# Patient Record
Sex: FEMALE | Race: WHITE | Employment: OTHER | URBAN - METROPOLITAN AREA
[De-identification: names, ages, dates, MRNs, and addresses within clinical notes are randomized per-mention and may not be internally consistent; named-entity substitution may affect disease eponyms.]

---

## 2020-05-08 DIAGNOSIS — Z11.59 ENCOUNTER FOR SCREENING FOR OTHER VIRAL DISEASES: Primary | ICD-10-CM

## 2020-10-27 ENCOUNTER — DOCUMENTATION ONLY (OUTPATIENT)
Dept: OTHER | Facility: CLINIC | Age: 35
End: 2020-10-27

## 2020-11-03 RX ORDER — ALPRAZOLAM 1 MG
1 TABLET ORAL PRN
COMMUNITY
Start: 2020-05-28

## 2020-11-03 RX ORDER — CALCIUM CARBONATE 1250 MG/5ML
1500 SUSPENSION ORAL 2 TIMES DAILY
COMMUNITY
Start: 2020-04-14

## 2020-11-03 RX ORDER — CLINDAMYCIN PHOSPHATE 10 MG/G
GEL TOPICAL 2 TIMES DAILY
COMMUNITY
Start: 2019-12-04

## 2020-11-03 RX ORDER — LAMOTRIGINE 100 MG/1
100 TABLET ORAL DAILY
COMMUNITY
Start: 2020-06-17

## 2020-11-03 RX ORDER — LEVETIRACETAM 100 MG/ML
12 SOLUTION ORAL 3 TIMES DAILY
COMMUNITY
Start: 2019-10-11

## 2020-11-03 RX ORDER — ACETAMINOPHEN 500 MG
500 TABLET ORAL EVERY 6 HOURS PRN
COMMUNITY

## 2020-11-03 RX ORDER — CHLORHEXIDINE GLUCONATE ORAL RINSE 1.2 MG/ML
15 SOLUTION DENTAL EVERY MORNING
COMMUNITY
Start: 2019-09-09

## 2020-11-03 RX ORDER — LACTOSE-REDUCED FOOD/FIBER 0.07 G-1.5
LIQUID (ML) ORAL
COMMUNITY
Start: 2019-09-23

## 2020-11-03 RX ORDER — CARBAMAZEPINE 100 MG/5ML
20 SUSPENSION ORAL 3 TIMES DAILY
COMMUNITY
Start: 2019-10-14

## 2020-11-03 RX ORDER — CLINDAMYCIN AND BENZOYL PEROXIDE 10; 50 MG/G; MG/G
GEL TOPICAL DAILY
COMMUNITY
Start: 2019-10-28

## 2020-11-03 RX ORDER — LAMOTRIGINE 200 MG/1
200 TABLET ORAL 2 TIMES DAILY
COMMUNITY
Start: 2019-10-11

## 2020-11-05 ENCOUNTER — ANESTHESIA EVENT (OUTPATIENT)
Dept: SURGERY | Facility: CLINIC | Age: 35
End: 2020-11-05
Payer: MEDICARE

## 2020-11-06 ENCOUNTER — ANESTHESIA (OUTPATIENT)
Dept: SURGERY | Facility: CLINIC | Age: 35
End: 2020-11-06
Payer: MEDICARE

## 2020-11-06 ENCOUNTER — HOSPITAL ENCOUNTER (OUTPATIENT)
Facility: CLINIC | Age: 35
Discharge: NURSING, REHAB OR LONG-TERM CARE | End: 2020-11-06
Attending: DENTIST | Admitting: DENTIST
Payer: MEDICARE

## 2020-11-06 VITALS
WEIGHT: 111.55 LBS | HEIGHT: 62 IN | RESPIRATION RATE: 16 BRPM | HEART RATE: 52 BPM | OXYGEN SATURATION: 94 % | BODY MASS INDEX: 20.53 KG/M2 | TEMPERATURE: 97.2 F | DIASTOLIC BLOOD PRESSURE: 77 MMHG | SYSTOLIC BLOOD PRESSURE: 119 MMHG

## 2020-11-06 PROCEDURE — 360N000017 HC SURGERY LEVEL 2 EA 15 ADDTL MIN - UMMC: Performed by: DENTIST

## 2020-11-06 PROCEDURE — 370N000002 HC ANESTHESIA TECHNICAL FEE, EACH ADDTL 15 MIN: Performed by: DENTIST

## 2020-11-06 PROCEDURE — 258N000003 HC RX IP 258 OP 636: Performed by: NURSE ANESTHETIST, CERTIFIED REGISTERED

## 2020-11-06 PROCEDURE — 360N000016 HC SURGERY LEVEL 2 1ST 30 MIN - UMMC: Performed by: DENTIST

## 2020-11-06 PROCEDURE — 250N000003 HC SEVOFLURANE, EA 15 MIN: Performed by: DENTIST

## 2020-11-06 PROCEDURE — 999N000140 HC STATISTIC PRE-PROCEDURE ASSESSMENT III: Performed by: DENTIST

## 2020-11-06 PROCEDURE — 761N000007 HC RECOVERY PHASE 2 EACH 15 MINS: Performed by: DENTIST

## 2020-11-06 PROCEDURE — 370N000001 HC ANESTHESIA TECHNICAL FEE, 1ST 30 MIN: Performed by: DENTIST

## 2020-11-06 PROCEDURE — 250N000011 HC RX IP 250 OP 636: Performed by: NURSE ANESTHETIST, CERTIFIED REGISTERED

## 2020-11-06 PROCEDURE — 250N000009 HC RX 250: Performed by: NURSE ANESTHETIST, CERTIFIED REGISTERED

## 2020-11-06 PROCEDURE — 761N000003 HC RECOVERY PHASE 1 LEVEL 2 FIRST HR: Performed by: DENTIST

## 2020-11-06 RX ORDER — NALOXONE HYDROCHLORIDE 0.4 MG/ML
.1-.4 INJECTION, SOLUTION INTRAMUSCULAR; INTRAVENOUS; SUBCUTANEOUS
Status: DISCONTINUED | OUTPATIENT
Start: 2020-11-06 | End: 2020-11-06 | Stop reason: HOSPADM

## 2020-11-06 RX ORDER — CHLORHEXIDINE GLUCONATE ORAL RINSE 1.2 MG/ML
10 SOLUTION DENTAL ONCE
Status: DISCONTINUED | OUTPATIENT
Start: 2020-11-06 | End: 2020-11-06 | Stop reason: HOSPADM

## 2020-11-06 RX ORDER — ONDANSETRON 2 MG/ML
INJECTION INTRAMUSCULAR; INTRAVENOUS PRN
Status: DISCONTINUED | OUTPATIENT
Start: 2020-11-06 | End: 2020-11-06

## 2020-11-06 RX ORDER — DEXAMETHASONE SODIUM PHOSPHATE 4 MG/ML
INJECTION, SOLUTION INTRA-ARTICULAR; INTRALESIONAL; INTRAMUSCULAR; INTRAVENOUS; SOFT TISSUE PRN
Status: DISCONTINUED | OUTPATIENT
Start: 2020-11-06 | End: 2020-11-06

## 2020-11-06 RX ORDER — PROPOFOL 10 MG/ML
INJECTION, EMULSION INTRAVENOUS PRN
Status: DISCONTINUED | OUTPATIENT
Start: 2020-11-06 | End: 2020-11-06

## 2020-11-06 RX ORDER — SODIUM CHLORIDE, SODIUM LACTATE, POTASSIUM CHLORIDE, CALCIUM CHLORIDE 600; 310; 30; 20 MG/100ML; MG/100ML; MG/100ML; MG/100ML
INJECTION, SOLUTION INTRAVENOUS CONTINUOUS PRN
Status: DISCONTINUED | OUTPATIENT
Start: 2020-11-06 | End: 2020-11-06

## 2020-11-06 RX ORDER — FENTANYL CITRATE 50 UG/ML
INJECTION, SOLUTION INTRAMUSCULAR; INTRAVENOUS PRN
Status: DISCONTINUED | OUTPATIENT
Start: 2020-11-06 | End: 2020-11-06

## 2020-11-06 RX ORDER — FENTANYL CITRATE 50 UG/ML
25-50 INJECTION, SOLUTION INTRAMUSCULAR; INTRAVENOUS
Status: DISCONTINUED | OUTPATIENT
Start: 2020-11-06 | End: 2020-11-06 | Stop reason: HOSPADM

## 2020-11-06 RX ORDER — ONDANSETRON 2 MG/ML
4 INJECTION INTRAMUSCULAR; INTRAVENOUS EVERY 30 MIN PRN
Status: DISCONTINUED | OUTPATIENT
Start: 2020-11-06 | End: 2020-11-06 | Stop reason: HOSPADM

## 2020-11-06 RX ORDER — KETAMINE HYDROCHLORIDE 100 MG/ML
INJECTION, SOLUTION INTRAMUSCULAR; INTRAVENOUS PRN
Status: DISCONTINUED | OUTPATIENT
Start: 2020-11-06 | End: 2020-11-06

## 2020-11-06 RX ORDER — ONDANSETRON 4 MG/1
4 TABLET, ORALLY DISINTEGRATING ORAL EVERY 30 MIN PRN
Status: DISCONTINUED | OUTPATIENT
Start: 2020-11-06 | End: 2020-11-06 | Stop reason: HOSPADM

## 2020-11-06 RX ORDER — SODIUM CHLORIDE, SODIUM LACTATE, POTASSIUM CHLORIDE, CALCIUM CHLORIDE 600; 310; 30; 20 MG/100ML; MG/100ML; MG/100ML; MG/100ML
INJECTION, SOLUTION INTRAVENOUS CONTINUOUS
Status: DISCONTINUED | OUTPATIENT
Start: 2020-11-06 | End: 2020-11-06 | Stop reason: HOSPADM

## 2020-11-06 RX ADMIN — KETAMINE HYDROCHLORIDE 75 MG: 100 INJECTION, SOLUTION, CONCENTRATE INTRAMUSCULAR; INTRAVENOUS at 09:29

## 2020-11-06 RX ADMIN — PROPOFOL 80 MG: 10 INJECTION, EMULSION INTRAVENOUS at 10:09

## 2020-11-06 RX ADMIN — PROPOFOL 30 MG: 10 INJECTION, EMULSION INTRAVENOUS at 10:11

## 2020-11-06 RX ADMIN — SUGAMMADEX 200 MG: 100 INJECTION, SOLUTION INTRAVENOUS at 12:08

## 2020-11-06 RX ADMIN — SODIUM CHLORIDE, POTASSIUM CHLORIDE, SODIUM LACTATE AND CALCIUM CHLORIDE: 600; 310; 30; 20 INJECTION, SOLUTION INTRAVENOUS at 10:08

## 2020-11-06 RX ADMIN — KETAMINE HYDROCHLORIDE 50 MG: 100 INJECTION, SOLUTION, CONCENTRATE INTRAMUSCULAR; INTRAVENOUS at 09:45

## 2020-11-06 RX ADMIN — ONDANSETRON 4 MG: 2 INJECTION INTRAMUSCULAR; INTRAVENOUS at 12:02

## 2020-11-06 RX ADMIN — FENTANYL CITRATE 50 MCG: 50 INJECTION, SOLUTION INTRAMUSCULAR; INTRAVENOUS at 11:17

## 2020-11-06 RX ADMIN — ROCURONIUM BROMIDE 50 MG: 10 INJECTION INTRAVENOUS at 10:09

## 2020-11-06 RX ADMIN — DEXAMETHASONE SODIUM PHOSPHATE 8 MG: 4 INJECTION, SOLUTION INTRAMUSCULAR; INTRAVENOUS at 10:48

## 2020-11-06 RX ADMIN — ROCURONIUM BROMIDE 30 MG: 10 INJECTION INTRAVENOUS at 11:17

## 2020-11-06 RX ADMIN — FENTANYL CITRATE 50 MCG: 50 INJECTION, SOLUTION INTRAMUSCULAR; INTRAVENOUS at 11:27

## 2020-11-06 ASSESSMENT — MIFFLIN-ST. JEOR: SCORE: 1164.25

## 2020-11-06 NOTE — ANESTHESIA PREPROCEDURE EVALUATION
Anesthesia Pre-Procedure Evaluation    Patient: Prerna Verde   MRN:     [id number] Gender:   female   Age:    35 year old :      1985        Preoperative Diagnosis: Dental caries [K02.9]  Periodontal disease [K05.6]   Procedure(s):  Dental Exam, Radiographs, Dental Restorations, Periodontal Therapy, Dental Extractions, Biopsies     LABS:  CBC: No results found for: WBC, HGB, HCT, PLT  BMP: No results found for: NA, POTASSIUM, CHLORIDE, CO2, BUN, CR, GLC  COAGS: No results found for: PTT, INR, FIBR  POC: No results found for: BGM, HCG, HCGS  OTHER: No results found for: PH, LACT, A1C, REGINALDO, PHOS, MAG, ALBUMIN, PROTTOTAL, ALT, AST, GGT, ALKPHOS, BILITOTAL, BILIDIRECT, LIPASE, AMYLASE, KELSEA, TSH, T4, T3, CRP, SED     Preop Vitals    BP Readings from Last 3 Encounters:   No data found for BP    Pulse Readings from Last 3 Encounters:   No data found for Pulse      Resp Readings from Last 3 Encounters:   No data found for Resp    SpO2 Readings from Last 3 Encounters:   No data found for SpO2      Temp Readings from Last 1 Encounters:   No data found for Temp    Ht Readings from Last 1 Encounters:   No data found for Ht      Wt Readings from Last 1 Encounters:   No data found for Wt    There is no height or weight on file to calculate BMI.         Anesthesia Evaluation          Neuro Findings   Comments: - H/o cerebral anoxic injury       Pulmonary Findings   Comments: - COVID 19 negative 4 days ago                            No past medical history on file.      No past surgical history on file.          Allergies:    Allergies   Allergen Reactions     Tetracycline Unknown           Meds:   Current Outpatient Medications   Medication Sig Dispense Refill     calcium carbonate 1250 MG/5ML SUSP suspension Take 1,500 mg by mouth 2 times daily       carBAMazepine (TEGRETOL) 100 MG/5ML suspension 20 mLs by Oral or G tube route 3 times daily Give 20mL Q AM, 20 mL @@ noon, and 30 mL Q HS       chlorhexidine (PERIDEX)  0.12 % solution 15 mLs by Other route every morning       cholecalciferol 25 MCG (1000 UT) TABS 1 tablet by Gastric Tube route daily       lamoTRIgine (LAMICTAL) 200 MG tablet 200 mg by Per G Tube route 2 times daily       levETIRAcetam (KEPPRA) 100 MG/ML solution 12 mLs by Oral or G tube route 3 times daily       magnesium hydroxide (MILK OF MAGNESIA) 400 MG/5ML suspension 10 mLs by Oral or G tube route daily       Nutritional Supplements (ISOSOURCE 1.5 REGINALDO) LIQD 4 CANS DAILY       acetaminophen (TYLENOL) 32 mg/mL liquid Take 640 mg by mouth every 4 hours as needed for fever or mild pain Take 20 mL per combo route       acetaminophen (TYLENOL) 500 MG tablet 500 mg by Enteral route every 6 hours as needed       ALPRAZolam (XANAX) 1 MG tablet 1 mg by Per G Tube route as needed 45 minutes prior to difficult personal care and/or doctor appointments. Crushed, dissolved in water       clindamycin (CLINDAMAX) 1 % external gel Apply topically 2 times daily       clindamycin-benzoyl peroxide (BENZACLIN) 1-5 % external gel Apply topically daily       lamoTRIgine (LAMICTAL) 100 MG tablet 100 mg by Oral or Feeding Tube route daily                 PHYSICAL EXAM:   Mental Status/Neuro:    Airway:   Mallampati: III  Mouth/Opening: Not Assessed  TM distance: Not Assessed  Neck ROM: Not Assessed   Respiratory:    CV:    Comments: Patient has severe MR - unable to examine patient - due to aggressive disposition.                     Assessment:   ASA SCORE: 3       NPO Status: NPO Appropriate     Plan:   Anes. Type:  General   Pre-Medication: None   Induction:  IV (Standard)   Airway: ETT; Oral   Access/Monitoring: PIV   Maintenance: Balanced     Postop Plan:   Postop Pain: Opioids  Postop Sedation/Airway: Not planned     PONV Management:   Adult Risk Factors: Female, Postop Opioids   Prevention: Ondansetron, Dexamethasone       Comments for Plan/Consent:  If alprazolam via G-tube does not sedate patient, will give patient 2-3 mgs/kg  IM ketamine.       Joie Estevez MD  Staff Anesthesiologist  Pager: [phone]

## 2020-11-06 NOTE — ANESTHESIA PROCEDURE NOTES
Airway   Date/Time: 11/6/2020 10:11 AM   Patient location during procedure: OR    Staff -   Anesthesiologist:  Moises Madden DO  CRNA: Joanna Gu APRN CRNA  Performed By: CRNA    Consent for Airway   Urgency: elective    Indications and Patient Condition  Indications for airway management: rodney-procedural  Induction type:inhalationalMask difficulty assessment: 1 - vent by mask    Final Airway Details  Final airway type: endotracheal airway  Successful airway:Nasal and RADHA (right NTT after lubricated check patency NAW 30, 32, all passed with ease, cuff intact)  Endotracheal Airway Details   Successful intubation technique: video laryngoscopy  Grade View of Cords: 1 (better view picking up the epiglottis)  Adjucts: tooth guard and magill forceps (tooth guared previously present, removed after intubation by surgeon for procedure)  Secured with: silk tape (benzoin on skin)  Bite block used: None    Post intubation assessment   Placement verified by: capnometry, equal breath sounds and chest rise   Number of attempts at approach: 1  Number of other approaches attempted: 0  Secured with:silk tape (benzoin on skin)  Ease of procedure: easy  Dentition: UnchangedAdditional Comments  Head wrap, scrub brush with foam side down. NTT support, position is neutral, humidivent and flexible Connector with tape = secure airway without pressure on nose, face, scalp

## 2020-11-06 NOTE — DISCHARGE INSTRUCTIONS
Same-Day Surgery   Adult Discharge Orders & Instructions     For 24 hours after surgery:  1. Get plenty of rest.  A responsible adult must stay with you for at least 24 hours after you leave the hospital.   2. Pain medication can slow your reflexes. Do not drive or use heavy equipment.  If you have weakness or tingling, don't drive or use heavy equipment until this feeling goes away.  3. Mixing alcohol and pain medication can cause dizziness and slow your breathing. It can even be fatal. Do not drink alcohol while taking pain medication.  4. Avoid strenuous or risky activities.  Ask for help when climbing stairs.   5. You may feel lightheaded.  If so, sit for a few minutes before standing.  Have someone help you get up.   6. If you have nausea (feel sick to your stomach), drink only clear liquids such as apple juice, ginger ale, broth or 7-Up.  Rest may also help.  Be sure to drink enough fluids.  Move to a regular diet as you feel able. Take pain medications with a small amount of solid food, such as toast or crackers, to avoid nausea.   7. A slight fever is normal. Call the doctor if your fever is over 100 F (37.7 C) (taken under the tongue) or lasts longer than 24 hours.  8. You may have a dry mouth, muscle aches, trouble sleeping or a sore throat.  These symptoms should go away after 24 hours.  9. Do not make important or legal decisions.   Pain Management:      1. Take pain medication (if prescribed) for pain as directed by your physician.        2. WARNING: If the pain medication you have been prescribed contains Tylenol  (acetaminophen), DO NOT take additional doses of Tylenol (acetaminophen).     Call your doctor for any of the followin.  Signs of infection (fever, growing tenderness at the surgery site, severe pain, a large amount of drainage or bleeding, foul-smelling drainage, redness, swelling).    2.  It has been over 8 to 10 hours since surgery and you are still not able to urinate (pee).    3.   Headache for over 24 hours.    4.  Numbness, tingling or weakness the day after surgery (if you had spinal anesthesia).   To contact a doctor, call   : Dr. Valeria Pink ([phone])               Cell: Dr Valeria Pink ([phone])      or:      [phone] and ask for the Resident On Call for:         Ears/nose/throat  (answered 24 hours a day)      Emergency Department:  [facility]: [phone]  [facility]: [phone]    If you use hormonal birth control (such as the pill, patch, ring or implants):  You will need a second form of birth control for 7 days (condoms, a diaphragm or contraceptive foam).  While in the surgery center, you received a medicine called Sugammadex.  Hormonal birth control (such as the pill, patch, ring or implants) may not work as well for a week after taking this medicine.

## 2020-11-06 NOTE — BRIEF OP NOTE
Dental Brief Operative Note      Procedure:  Comprehensive exam, Full mouth radiographs, cleaning, impressions, fluoride    Surgeon:  Valeria Pink DDS    Assistant: Charisma FRANKS    Anesthesia: General anesthesia with nasal intubation     Estimated blood loss: 5 cc    Total IV fluids: 650 cc    Total urine output: N/A no catheter    Complications:  None    Condition: Patient taken to recovery in stable condition.    Circulator: Silvio Hawley RN  Relief Circulator: Marko Sandoval RN and Anesthesiologist: Moises Madden DO  No responsible provider has been recorded for the case.    Valeria Pink DDS,  November 6, 2020

## 2020-11-06 NOTE — OR NURSING
Prerna is uncooperative, she has very long pointed nails. Hayde, caregiver would like to cut the nails while under sedation. Unable to assess patient. She will not get into bed. Caregivers are wonderful with her

## 2020-11-06 NOTE — ANESTHESIA POSTPROCEDURE EVALUATION
Anesthesia POST Procedure Evaluation    Patient: Prerna Verde   MRN:     [id number] Gender:   female   Age:    35 year old :      1985        Preoperative Diagnosis: Dental caries [K02.9]  Periodontal disease [K05.6]   Procedure(s):  Dental Exam, Radiographs, Periodontal Therapy, Impressions, Prophy   Postop Comments: No value filed.     Anesthesia Type: General          Postop Pain Control: Uneventful            Sign Out: Well controlled pain   PONV: No   Neuro/Psych: Uneventful            Sign Out: Acceptable/Baseline neuro status   Airway/Respiratory: Uneventful            Sign Out: Acceptable/Baseline resp. status   CV/Hemodynamics: Uneventful            Sign Out: Acceptable CV status   Other NRE: NONE   DID A NON-ROUTINE EVENT OCCUR? No         Last Anesthesia Record Vitals:  CRNA VITALS  2020 1205 - 2020 1305      2020             SpO2:  93 %    EKG:  Sinus rhythm          Last PACU Vitals:  Vitals Value Taken Time   /77 20 1315   Temp 36.2  C (97.2  F) 20 1240   Pulse 52 20 1315   Resp 35 20 1315   SpO2 93 % 20 1322   Temp src     NIBP 116/71 20 1246   Pulse     SpO2 93 % 20 1249   Resp     Temp 36.2  C (97.2  F) 20 1242   Ht Rate 50 20 1246   Temp 2     Vitals shown include unvalidated device data.      Electronically Signed By: Moises Madden DO, 2020, 1:55 PM

## 2020-11-06 NOTE — OP NOTE
"Operative Note    Subjective:   Prerna Verde ([id number]) is a 35 year old year old female who has been diagnosed with MR.  The patient was evaluated at [facility] in [facility] and was found to be uncooperative.  Due to this patient's inability to cooperate in a dental setting, it was necessary for dental treatment to be completed under general anesthesia in the operating room.  The patient presents to [facility] with 2 staff for dental procedures under General Anesthesia.  The patient was taken to preinduction where an IV was started and then transported to the operating room. Nasoendotracheal intubation was accomplished without complications.     Last Solid Intake: 11/5/2020 7 PM   Last Liquid intake: 11/5/2020 7 PM  Staff denies that patient has cough, cold, nasal congestion.    Staff reports:  Oral Hygiene Brushes: 2 per day, staff assists; Flossing 0 per day, unable due to cooperation.    Mouth rinse 2 per day with Chlorhexadine.  Patient Status:  1. H+P was performed by Dr. Carroll, on 10/27/2020.  There are no contraindications to the planned procedure.  2. Allergies: Tetracycline  3. Labs:  No labs were completed by her MD. CoVID 19 test was (-)   4. Vital Signs: Temp 97.2  F (36.2  C) (Temporal)   Resp 18   Ht 1.575 m (5' 2\")   Wt 50.6 kg (111 lb 8.8 oz)   BMI 20.40 kg/m    5. Consents signed and in the chart.     Medications:     Medications Prior to Admission   Medication Sig Dispense Refill Last Dose     ALPRAZolam (XANAX) 1 MG tablet 1 mg by Per G Tube route as needed 45 minutes prior to difficult personal care and/or doctor appointments. Crushed, dissolved in water   11/6/2020 at 0800     calcium carbonate 1250 MG/5ML SUSP suspension Take 1,500 mg by mouth 2 times daily        carBAMazepine (TEGRETOL) 100 MG/5ML suspension 20 mLs by Oral or G tube route 3 times daily Give 20mL Q AM, 20 mL @@ noon, and 30 mL Q HS   11/6/2020 at 0630     chlorhexidine " (PERIDEX) 0.12 % solution 15 mLs by Other route every morning   11/6/2020 at Unknown time     cholecalciferol 25 MCG (1000 UT) TABS 1 tablet by Gastric Tube route daily   11/5/2020 at Unknown time     lamoTRIgine (LAMICTAL) 100 MG tablet 100 mg by Oral or Feeding Tube route daily   11/5/2020 at 2100     lamoTRIgine (LAMICTAL) 200 MG tablet 200 mg by Per G Tube route 2 times daily   11/6/2020 at 0630     levETIRAcetam (KEPPRA) 100 MG/ML solution 12 mLs by Oral or G tube route 3 times daily   11/6/2020 at 0630     magnesium hydroxide (MILK OF MAGNESIA) 400 MG/5ML suspension 10 mLs by Oral or G tube route daily        Nutritional Supplements (ISOSOURCE 1.5 REGINALDO) LIQD 4 CANS DAILY   11/5/2020 at Unknown time     acetaminophen (TYLENOL) 32 mg/mL liquid Take 640 mg by mouth every 4 hours as needed for fever or mild pain Take 20 mL per combo route   More than a month at Unknown time     acetaminophen (TYLENOL) 500 MG tablet 500 mg by Enteral route every 6 hours as needed   More than a month at Unknown time     clindamycin (CLINDAMAX) 1 % external gel Apply topically 2 times daily        clindamycin-benzoyl peroxide (BENZACLIN) 1-5 % external gel Apply topically daily                           Objective:  Exam: Extra oral: WNL   IntraOral Soft Tissue Exam: Gingival Inflammation throughout , with overgrown gingiva and teeth that are not fully erupted. Periodontal Probing depths charted, Hard Tissue Exam as Charted.  FMX Reveals: Generalized, Early, Bone Loss Present Throughout.    Caries present on: none   Missing Teeth: #1, 2, 15, 16, 17, 18, 30, 32  Restorations present on: #2, 8, 9, 14, 19, 21, 22, 23, 24, 25, 26      Assessment:   Caries: inactive   Caries Risk: low  AAP:  II  OH:  Poor, subgingival calculus throughout however the staff are not able to floss due to cooperation.  Other: Her nails were trimmed at the procedure    Procedure:  Procedure(s):  Dental Exam, Radiographs, Dental Restorations, Periodontal Therapy,  Dental Extractions, Biopsies   Time Out completed prior to start of procedure. A Guaze throat pack placed; Exam completed; FMX taken;  Oral tissues thoroughly brushed with CHX 0.12%; prophy all 4 quadrants with cavitron followed by hand instrumentation.  Polish.  Hemostasis confirmed,  Fluoride placed on all surfaces.  Throat pack removed and the posterior oropharynx suctioned.  Estimated blood loss: Less than 10 ml    IV Prescriptions Given:  none  POI:  Given to: staff. Instructed to use standing order for pain control.  RTC:  3mrc; Return to OR in 2 to 5 years  Circulator: Silvio Hawley, PARESH  Relief Circulator: Marko Sandoval RN and Anesthesiologist: Moises Madden DO  No responsible provider has been recorded for the case.  Charisma Rogers LDA present.   Valeria Pink, CRISTIANOS,  November 6, 2020

## (undated) DEVICE — POSITIONER ARMBOARD FOAM 1PAIR LF FP-ARMB1

## (undated) DEVICE — PACK SET-UP STD 9102

## (undated) DEVICE — SPONGE PACK THROAT 2X18" 31-708

## (undated) DEVICE — RX BACITRACIN OINTMENT 0.9G 1/32OZ CUR001109

## (undated) DEVICE — DRAPE STERI TOWEL LG 1010

## (undated) DEVICE — ANTIFOG SOLUTION W/FOAM PAD 31142527

## (undated) DEVICE — STRAP KNEE/BODY 31143004

## (undated) DEVICE — BASIN SET MAJOR

## (undated) DEVICE — SPONGE RAY-TEC 4X8" 7318

## (undated) DEVICE — TOOTHBRUSH ADULT NON STERILE MDS136850

## (undated) DEVICE — COVER PROBE ULTRASOUND 3D W/GEL 5X96" LF 20-P3D596

## (undated) DEVICE — LIGHT HANDLE X2

## (undated) DEVICE — SOL NACL 0.9% IRRIG 1000ML BOTTLE 2F7124

## (undated) DEVICE — SUCTION TIP YANKAUER W/O VENT K86

## (undated) DEVICE — BRUSH SURGICAL SCRUB PLAIN STERILE 4454A

## (undated) DEVICE — LINEN ORTHO PACK 5446

## (undated) DEVICE — SOL WATER IRRIG 1000ML BOTTLE 2F7114

## (undated) DEVICE — LABEL MEDICATION SYSTEM 3303-P

## (undated) DEVICE — GLOVE PROTEXIS POWDER FREE 7.0 ORTHOPEDIC 2D73ET70

## (undated) DEVICE — GOWN XLG DISP 9545

## (undated) DEVICE — TUBING SUCTION MEDI-VAC 1/4"X20' N620A

## (undated) DEVICE — DRAPE MAYO STAND 23X54 8337

## (undated) RX ORDER — KETAMINE HYDROCHLORIDE 100 MG/ML
INJECTION, SOLUTION INTRAMUSCULAR; INTRAVENOUS
Status: DISPENSED
Start: 2020-11-06

## (undated) RX ORDER — ONDANSETRON 2 MG/ML
INJECTION INTRAMUSCULAR; INTRAVENOUS
Status: DISPENSED
Start: 2020-11-06

## (undated) RX ORDER — CHLORHEXIDINE GLUCONATE ORAL RINSE 1.2 MG/ML
SOLUTION DENTAL
Status: DISPENSED
Start: 2020-11-06

## (undated) RX ORDER — OXYMETAZOLINE HYDROCHLORIDE 0.05 G/100ML
SPRAY NASAL
Status: DISPENSED
Start: 2020-11-06

## (undated) RX ORDER — FENTANYL CITRATE 50 UG/ML
INJECTION, SOLUTION INTRAMUSCULAR; INTRAVENOUS
Status: DISPENSED
Start: 2020-11-06